# Patient Record
Sex: FEMALE | Race: OTHER | NOT HISPANIC OR LATINO
[De-identification: names, ages, dates, MRNs, and addresses within clinical notes are randomized per-mention and may not be internally consistent; named-entity substitution may affect disease eponyms.]

---

## 2020-05-08 PROBLEM — Z00.00 ENCOUNTER FOR PREVENTIVE HEALTH EXAMINATION: Status: ACTIVE | Noted: 2020-05-08

## 2020-05-12 ENCOUNTER — APPOINTMENT (OUTPATIENT)
Dept: OBGYN | Facility: CLINIC | Age: 50
End: 2020-05-12

## 2020-06-16 ENCOUNTER — APPOINTMENT (OUTPATIENT)
Dept: OBGYN | Facility: CLINIC | Age: 50
End: 2020-06-16
Payer: MEDICAID

## 2020-06-16 VITALS
TEMPERATURE: 97.5 F | WEIGHT: 138 LBS | HEART RATE: 102 BPM | SYSTOLIC BLOOD PRESSURE: 127 MMHG | BODY MASS INDEX: 26.06 KG/M2 | HEIGHT: 61 IN | OXYGEN SATURATION: 98 % | DIASTOLIC BLOOD PRESSURE: 83 MMHG

## 2020-06-16 DIAGNOSIS — D21.9 BENIGN NEOPLASM OF CONNECTIVE AND OTHER SOFT TISSUE, UNSPECIFIED: ICD-10-CM

## 2020-06-16 DIAGNOSIS — Z56.0 UNEMPLOYMENT, UNSPECIFIED: ICD-10-CM

## 2020-06-16 DIAGNOSIS — Z92.89 PERSONAL HISTORY OF OTHER MEDICAL TREATMENT: ICD-10-CM

## 2020-06-16 PROCEDURE — 99203 OFFICE O/P NEW LOW 30 MIN: CPT

## 2020-06-16 SDOH — ECONOMIC STABILITY - INCOME SECURITY: UNEMPLOYMENT, UNSPECIFIED: Z56.0

## 2020-06-16 NOTE — HISTORY OF PRESENT ILLNESS
[Normal Amount/Duration] : was of a normal amount and duration [Definite:  ___ (Date)] : the last menstrual period was [unfilled] [Sexually Active] : is sexually active [Menarche Age: ____] : age at menarche was [unfilled] [Male ___] : [unfilled] male [Regular Cycle Intervals] : periods have been irregular

## 2020-06-16 NOTE — PHYSICAL EXAM
[Awake] : awake [Alert] : alert [Soft] : soft [Oriented x3] : oriented to person, place, and time [Normal] : uterus [No Bleeding] : there was no active vaginal bleeding [Enlarged ___ wks] : enlarged [unfilled] ~Uweeks [Pap Obtained] : a Pap smear was performed [Normal Position] : in a normal position [Uterine Adnexae] : were not tender and not enlarged [Acute Distress] : no acute distress [Nipple Discharge] : no nipple discharge [Mass] : no breast mass [Axillary LAD] : no axillary lymphadenopathy [Tender] : non tender

## 2020-06-18 LAB
C TRACH RRNA SPEC QL NAA+PROBE: NOT DETECTED
HPV HIGH+LOW RISK DNA PNL CVX: NOT DETECTED
N GONORRHOEA RRNA SPEC QL NAA+PROBE: NOT DETECTED
SOURCE TP AMPLIFICATION: NORMAL

## 2020-06-19 LAB — CYTOLOGY CVX/VAG DOC THIN PREP: NORMAL

## 2020-06-22 ENCOUNTER — INPATIENT (INPATIENT)
Facility: HOSPITAL | Age: 50
LOS: 0 days | Discharge: HOME | DRG: 761 | End: 2020-06-23
Attending: HOSPITALIST | Admitting: HOSPITALIST
Payer: MEDICAID

## 2020-06-22 ENCOUNTER — TRANSCRIPTION ENCOUNTER (OUTPATIENT)
Age: 50
End: 2020-06-22

## 2020-06-22 VITALS
HEIGHT: 62 IN | RESPIRATION RATE: 16 BRPM | OXYGEN SATURATION: 100 % | TEMPERATURE: 98 F | DIASTOLIC BLOOD PRESSURE: 78 MMHG | HEART RATE: 91 BPM | WEIGHT: 138.01 LBS | SYSTOLIC BLOOD PRESSURE: 115 MMHG

## 2020-06-22 DIAGNOSIS — D64.9 ANEMIA, UNSPECIFIED: ICD-10-CM

## 2020-06-22 DIAGNOSIS — Z29.9 ENCOUNTER FOR PROPHYLACTIC MEASURES, UNSPECIFIED: ICD-10-CM

## 2020-06-22 DIAGNOSIS — D25.9 LEIOMYOMA OF UTERUS, UNSPECIFIED: ICD-10-CM

## 2020-06-22 LAB
ABO RH CONFIRMATION: SIGNIFICANT CHANGE UP
ALBUMIN SERPL ELPH-MCNC: 3.5 G/DL — SIGNIFICANT CHANGE UP (ref 3.5–5)
ALP SERPL-CCNC: 68 U/L — SIGNIFICANT CHANGE UP (ref 40–120)
ALT FLD-CCNC: 25 U/L DA — SIGNIFICANT CHANGE UP (ref 10–60)
ANION GAP SERPL CALC-SCNC: 8 MMOL/L — SIGNIFICANT CHANGE UP (ref 5–17)
APPEARANCE UR: CLEAR — SIGNIFICANT CHANGE UP
APTT BLD: 22.4 SEC — LOW (ref 27.5–36.3)
AST SERPL-CCNC: 15 U/L — SIGNIFICANT CHANGE UP (ref 10–40)
BASOPHILS # BLD AUTO: 0.07 K/UL — SIGNIFICANT CHANGE UP (ref 0–0.2)
BASOPHILS NFR BLD AUTO: 0.9 % — SIGNIFICANT CHANGE UP (ref 0–2)
BILIRUB SERPL-MCNC: 0.2 MG/DL — SIGNIFICANT CHANGE UP (ref 0.2–1.2)
BILIRUB UR-MCNC: NEGATIVE — SIGNIFICANT CHANGE UP
BUN SERPL-MCNC: 12 MG/DL — SIGNIFICANT CHANGE UP (ref 7–18)
CALCIUM SERPL-MCNC: 9 MG/DL — SIGNIFICANT CHANGE UP (ref 8.4–10.5)
CHLORIDE SERPL-SCNC: 107 MMOL/L — SIGNIFICANT CHANGE UP (ref 96–108)
CO2 SERPL-SCNC: 23 MMOL/L — SIGNIFICANT CHANGE UP (ref 22–31)
COLOR SPEC: YELLOW — SIGNIFICANT CHANGE UP
CREAT SERPL-MCNC: 0.7 MG/DL — SIGNIFICANT CHANGE UP (ref 0.5–1.3)
DIFF PNL FLD: NEGATIVE — SIGNIFICANT CHANGE UP
EOSINOPHIL # BLD AUTO: 0.18 K/UL — SIGNIFICANT CHANGE UP (ref 0–0.5)
EOSINOPHIL NFR BLD AUTO: 2.3 % — SIGNIFICANT CHANGE UP (ref 0–6)
GLUCOSE SERPL-MCNC: 86 MG/DL — SIGNIFICANT CHANGE UP (ref 70–99)
GLUCOSE UR QL: NEGATIVE — SIGNIFICANT CHANGE UP
HCG SERPL-ACNC: <1 MIU/ML — SIGNIFICANT CHANGE UP
HCT VFR BLD CALC: 24.4 % — LOW (ref 34.5–45)
HGB BLD-MCNC: 7.1 G/DL — LOW (ref 11.5–15.5)
IMM GRANULOCYTES NFR BLD AUTO: 0.5 % — SIGNIFICANT CHANGE UP (ref 0–1.5)
INR BLD: 1.03 RATIO — SIGNIFICANT CHANGE UP (ref 0.88–1.16)
IRON SATN MFR SERPL: 14 UG/DL — LOW (ref 40–150)
IRON SATN MFR SERPL: 3 % — LOW (ref 15–50)
KETONES UR-MCNC: NEGATIVE — SIGNIFICANT CHANGE UP
LEUKOCYTE ESTERASE UR-ACNC: NEGATIVE — SIGNIFICANT CHANGE UP
LYMPHOCYTES # BLD AUTO: 1.88 K/UL — SIGNIFICANT CHANGE UP (ref 1–3.3)
LYMPHOCYTES # BLD AUTO: 24.3 % — SIGNIFICANT CHANGE UP (ref 13–44)
MCHC RBC-ENTMCNC: 21.1 PG — LOW (ref 27–34)
MCHC RBC-ENTMCNC: 29.1 GM/DL — LOW (ref 32–36)
MCV RBC AUTO: 72.6 FL — LOW (ref 80–100)
MONOCYTES # BLD AUTO: 0.55 K/UL — SIGNIFICANT CHANGE UP (ref 0–0.9)
MONOCYTES NFR BLD AUTO: 7.1 % — SIGNIFICANT CHANGE UP (ref 2–14)
NEUTROPHILS # BLD AUTO: 5.03 K/UL — SIGNIFICANT CHANGE UP (ref 1.8–7.4)
NEUTROPHILS NFR BLD AUTO: 64.9 % — SIGNIFICANT CHANGE UP (ref 43–77)
NITRITE UR-MCNC: NEGATIVE — SIGNIFICANT CHANGE UP
NRBC # BLD: 0 /100 WBCS — SIGNIFICANT CHANGE UP (ref 0–0)
OB PNL STL: NEGATIVE — SIGNIFICANT CHANGE UP
PH UR: 5 — SIGNIFICANT CHANGE UP (ref 5–8)
PLATELET # BLD AUTO: 364 K/UL — SIGNIFICANT CHANGE UP (ref 150–400)
POTASSIUM SERPL-MCNC: 3.7 MMOL/L — SIGNIFICANT CHANGE UP (ref 3.5–5.3)
POTASSIUM SERPL-SCNC: 3.7 MMOL/L — SIGNIFICANT CHANGE UP (ref 3.5–5.3)
PROT SERPL-MCNC: 7.9 G/DL — SIGNIFICANT CHANGE UP (ref 6–8.3)
PROT UR-MCNC: NEGATIVE — SIGNIFICANT CHANGE UP
PROTHROM AB SERPL-ACNC: 11.7 SEC — SIGNIFICANT CHANGE UP (ref 10–12.9)
RBC # BLD: 3.36 M/UL — LOW (ref 3.8–5.2)
RBC # FLD: 17.3 % — HIGH (ref 10.3–14.5)
SODIUM SERPL-SCNC: 138 MMOL/L — SIGNIFICANT CHANGE UP (ref 135–145)
SP GR SPEC: 1.01 — SIGNIFICANT CHANGE UP (ref 1.01–1.02)
TIBC SERPL-MCNC: 485 UG/DL — HIGH (ref 250–450)
UIBC SERPL-MCNC: 471 UG/DL — HIGH (ref 110–370)
UROBILINOGEN FLD QL: NEGATIVE — SIGNIFICANT CHANGE UP
WBC # BLD: 7.75 K/UL — SIGNIFICANT CHANGE UP (ref 3.8–10.5)
WBC # FLD AUTO: 7.75 K/UL — SIGNIFICANT CHANGE UP (ref 3.8–10.5)

## 2020-06-22 PROCEDURE — 99223 1ST HOSP IP/OBS HIGH 75: CPT | Mod: GC

## 2020-06-22 PROCEDURE — 71046 X-RAY EXAM CHEST 2 VIEWS: CPT | Mod: 26

## 2020-06-22 PROCEDURE — 76857 US EXAM PELVIC LIMITED: CPT | Mod: 26

## 2020-06-22 PROCEDURE — 99283 EMERGENCY DEPT VISIT LOW MDM: CPT

## 2020-06-22 PROCEDURE — 76830 TRANSVAGINAL US NON-OB: CPT | Mod: 26

## 2020-06-22 RX ORDER — FERROUS SULFATE 325(65) MG
325 TABLET ORAL DAILY
Refills: 0 | Status: DISCONTINUED | OUTPATIENT
Start: 2020-06-22 | End: 2020-06-22

## 2020-06-22 RX ORDER — IRON SUCROSE 20 MG/ML
200 INJECTION, SOLUTION INTRAVENOUS ONCE
Refills: 0 | Status: COMPLETED | OUTPATIENT
Start: 2020-06-23 | End: 2020-06-23

## 2020-06-22 RX ORDER — IRON SUCROSE 20 MG/ML
200 INJECTION, SOLUTION INTRAVENOUS ONCE
Refills: 0 | Status: COMPLETED | OUTPATIENT
Start: 2020-06-22 | End: 2020-06-22

## 2020-06-22 RX ADMIN — IRON SUCROSE 110 MILLIGRAM(S): 20 INJECTION, SOLUTION INTRAVENOUS at 20:54

## 2020-06-22 NOTE — DISCHARGE NOTE PROVIDER - NSDCMRMEDTOKEN_GEN_ALL_CORE_FT
ascorbic acid 500 mg oral tablet, chewable: 1 tab(s) orally once a day  ferrous sulfate 325 mg (65 mg elemental iron) oral delayed release tablet: 1 tab(s) orally once a day  senna oral tablet: 2 tab(s) orally once a day (at bedtime)

## 2020-06-22 NOTE — H&P ADULT - PROBLEM SELECTOR PLAN 2
-will hold AC in the setting of anemia -H/o fibroid uterus 2 years diagnosed 2 years ago  -Pelvic/Transvaginal US: Enlarged fibroid uterus. Mildly prominent endometrium of the 17 mm  -Pt f/u outpatient gynecologist Dr. Upton in [facility]

## 2020-06-22 NOTE — H&P ADULT - NSHPPHYSICALEXAM_GEN_ALL_CORE
Vital Signs Last 24 Hrs  T(C): 36.7 (22 Jun 2020 15:20), Max: 36.9 (22 Jun 2020 13:11)  T(F): 98 (22 Jun 2020 15:20), Max: 98.4 (22 Jun 2020 13:11)  HR: 94 (22 Jun 2020 15:20) (91 - 94)  BP: 122/74 (22 Jun 2020 15:20) (115/78 - 122/74)  BP(mean): --  RR: 16 (22 Jun 2020 15:20) (16 - 16)  SpO2: 99% (22 Jun 2020 15:20) (99% - 100%) Vital Signs Last 24 Hrs  T(C): 36.7 (22 Jun 2020 15:20), Max: 36.9 (22 Jun 2020 13:11)  T(F): 98 (22 Jun 2020 15:20), Max: 98.4 (22 Jun 2020 13:11)  HR: 94 (22 Jun 2020 15:20) (91 - 94)  BP: 122/74 (22 Jun 2020 15:20) (115/78 - 122/74)  RR: 16 (22 Jun 2020 15:20) (16 - 16)  SpO2: 99% (22 Jun 2020 15:20) (99% - 100%)

## 2020-06-22 NOTE — H&P ADULT - NSHPLABSRESULTS_GEN_ALL_CORE
7.1    7.75  )-----------( 364      ( 22 Jun 2020 14:43 )             24.4       06-22    138  |  107  |  12  ----------------------------<  86  3.7   |  23  |  0.70    Ca    9.0      22 Jun 2020 14:43    TPro  7.9  /  Alb  3.5  /  TBili  0.2  /  DBili  x   /  AST  15  /  ALT  25  /  AlkPhos  68  06-22      < from: Xray Chest 2 Views PA/Lat (06.22.20 @ 15:25) >    Heart size is within normal limits. The lung fields and pleural surfaces are unremarkable.    IMPRESSION: Negative chest.    < end of copied text >

## 2020-06-22 NOTE — ED PROVIDER NOTE - OBJECTIVE STATEMENT
51 y/o female who denies PMHx presents with abnormal lab work.  Pt says this past April she had a miscarriage and had heavy vaginal bleeding for over 1 week.  This was during the COVID pandemic and patient did not want to come to the hospital.  The bleeding resolved in 1 week and the patient never followed up.  The following month (May) patient again said she had a heavy menstrual cycle which lasted 1 week.  pt is not currently vaginal bleeding.   pt had routine follow up with her PMD over the weekend and was called today and told to go the emergency department because of anemia.  Pt denies chest pain, but does endorse worsening dyspnea on exertion and feeling fatigued.

## 2020-06-22 NOTE — DISCHARGE NOTE PROVIDER - HOSPITAL COURSE
51 y/o female with PMHx of uterine fibroid was sent by his PCP due to low Hb of 6.7. Pt says this past April she had a miscarriage and had heavy vaginal bleeding for over 1 week. This was during the COVID pandemic and patient did not want to come to the hospital. The bleeding resolved in 1 week and the patient never followed up.  Last month again patient had a heavy menstrual cycle which lasted 1 week. Pt had routine follow up with her PMD over the weekend and was called today and was told to go the emergency department because of anemia.Pt  endorses  dyspnea on exertion and fatigue. Pt denies any chest pain, palpitations, sweating, recent trauma or surgery, abdominal pain. N/V/D,  blurry vision, black stools, blood in urine or any other acute complaints.     Pt was admitted for Symptomatic anemia due to menorrhagia    Pelvic/Transvaginal US: Enlarged fibroid uterus. Mildly prominent endometrium of the 17 mm    Transfused 1 PRBC and gave venofer. 51 y/o female with PMHx of uterine fibroid was sent by his PCP due to low Hb of 6.7. Pt says this past April she had a miscarriage and had heavy vaginal bleeding for over 1 week. This was during the COVID pandemic and patient did not want to come to the hospital. The bleeding resolved in 1 week and the patient never followed up.  Last month again patient had a heavy menstrual cycle which lasted 1 week. Pt had routine follow up with her PMD over the weekend and was called today and was told to go the emergency department because of anemia.Pt  endorses  dyspnea on exertion and fatigue. Pt denies any chest pain, palpitations, sweating, recent trauma or surgery, abdominal pain. N/V/D,  blurry vision, black stools, blood in urine or any other acute complaints.     Pt was admitted for Symptomatic anemia due to menorrhagia    Pelvic/Transvaginal US: Enlarged fibroid uterus. Mildly prominent endometrium of the 17 mm    Transfused 1 PRBC and gave venofer.                 The hospital course of this patient was uncomplicated and the patient was discharged in stable medical condition. 51 y/o female with PMHx of uterine fibroid was sent by her PCP due to low Hb of 6.7. Pt with recent history of miscarriage and heavy vaginal bleeding. Pt admitted for symptomatic anemia due to menorrhagia. Hemoglobin on admission was 7.1/21.1. Transfused one unit PRBC on 6/23 with improvement to 9.1/22.9. Given two doses of IV venofer. Pelvic/TV ultrasound showed enlarged fibroid uterus, mildly prominent endometrium of the 17mm. Pt to follow up with GYN as an outpatient.         Pt is clinically improved and medically stable for discharge home.

## 2020-06-22 NOTE — H&P ADULT - PROBLEM SELECTOR PLAN 1
-p/w Hb 7.1, ZARATE and fatigue  -Microcytic, hypochromic anemia due to Iron deficiency due to heavy menstrual periods  -Hemodynamically stable  -Serum iron levels, % saturation low, TIBC high  -FOBT negative  -will transfuse 2 PRBC  -will give iron supplementation   -f/u Post transfusion CBC  -Monitor CBC -p/w Hb 7.1, ZARATE and fatigue  -Microcytic, hypochromic anemia due to Iron deficiency due to heavy menstrual periods  -Hemodynamically stable  -Serum iron levels, % saturation low, TIBC high  -FOBT negative  -will transfuse 2 PRBC  -will give iron supplementation   -f/u Post transfusion CBC  -f/u US Pelvis/Transvaginal  -Monitor CBC  -GYN consult -p/w Hb 7.1, ZARATE and fatigue  -Iron deficiency anemia due to menorrhagia  -Hemodynamically stable  -Serum iron levels, % saturation low, TIBC high  -FOBT negative  -Pelvic/Transvaginal US: Enlarged fibroid uterus. Mildly prominent endometrium of the 17 mm  -will transfuse 2 PRBC  -will give iron supplementation   -f/u Post transfusion CBC  -Monitor CBC -p/w Hb 7.1, ZARATE and fatigue  -Iron deficiency anemia due to menorrhagia  -Hemodynamically stable  -Serum iron levels, % saturation low, TIBC high  -FOBT negative  -Pelvic/Transvaginal US: Enlarged fibroid uterus. Mildly prominent endometrium of the 17 mm  -will transfuse 1 PRBC  -will give iron supplementation   -f/u Post transfusion CBC  -Monitor CBC -p/w Hb 7.1, ZARATE and fatigue  -Iron deficiency anemia due to menorrhagia  -Hemodynamically stable  -Serum iron levels, % saturation low, TIBC high  -FOBT negative  -Pelvic/Transvaginal US: Enlarged fibroid uterus. Mildly prominent endometrium of the 17 mm  -will transfuse 1 PRBC  -will give venofer 200 mg once today and tomorrow  -f/u Post transfusion CBC  -Monitor CBC

## 2020-06-22 NOTE — H&P ADULT - HISTORY OF PRESENT ILLNESS
49 y/o female with no significant PMHx was sent by his PCP due to abnormal lab result of low Hb. Pt says this past April she had a miscarriage and had heavy vaginal bleeding for over 1 week.This was during the COVID pandemic and patient did not want to come to the hospital.The bleeding resolved in 1 week and the patient never followed up.  Last month again patient had a heavy menstrual cycle which lasted 1 week. Pt had routine follow up with her PMD over the weekend and was called today and was told to go the emergency department because of anemia.Pt  endorses  dyspnea on exertion and fatigue. Pt denies any chest pain, palpitations, sweating, recent trauma or surgery, abdominal pain. N/V/D,  blurry vision, black stools, blood in urine or any other acute complaints. 49 y/o female with no significant PMHx was sent by his PCP due to abnormal lab result of low Hb. Pt says this past April she had a miscarriage and had heavy vaginal bleeding for over 1 week.This was during the COVID pandemic and patient did not want to come to the hospital.The bleeding resolved in 1 week and the patient never followed up.  Last month again patient had a heavy menstrual cycle which lasted 1 week. Pt had routine follow up with her PMD over the weekend and was called today and was told to go the emergency department because of anemia.Pt  endorses  dyspnea on exertion and fatigue. Pt denies any chest pain, palpitations, sweating, recent trauma or surgery, abdominal pain. N/V/D,  blurry vision, black stools, blood in urine or any other acute complaints.     In ED, /74, HR 94 51 y/o female with PMHx of uterine fibroid was sent by his PCP due to low Hb of 6.7. Pt says this past April she had a miscarriage and had heavy vaginal bleeding for over 1 week. This was during the COVID pandemic and patient did not want to come to the hospital. The bleeding resolved in 1 week and the patient never followed up.  Last month again patient had a heavy menstrual cycle which lasted 1 week. Pt had routine follow up with her PMD over the weekend and was called today and was told to go the emergency department because of anemia.Pt  endorses  dyspnea on exertion and fatigue. Pt denies any chest pain, palpitations, sweating, recent trauma or surgery, abdominal pain. N/V/D,  blurry vision, black stools, blood in urine or any other acute complaints.     In ED, /74, HR 94

## 2020-06-22 NOTE — H&P ADULT - GASTROINTESTINAL DETAILS
nontender/soft/bowel sounds normal no guarding/nontender/soft/no rebound tenderness/bowel sounds normal/no distention

## 2020-06-22 NOTE — H&P ADULT - ATTENDING COMMENTS
Patient was seen and examined by myself. Case was discussed with house staff in details. I have reviewed and agree with the plan as outlined above with edits where appropriate.      MEDICAL ATTENDING ADDENDUM to H&P      Vital Signs Last 24 Hrs  T(C): 37.1 (22 Jun 2020 20:45), Max: 37.1 (22 Jun 2020 20:45)  T(F): 98.8 (22 Jun 2020 20:45), Max: 98.8 (22 Jun 2020 20:45)  HR: 71 (22 Jun 2020 20:45) (71 - 96)  BP: 111/71 (22 Jun 2020 20:45) (111/71 - 125/71)  RR: 18 (22 Jun 2020 20:45) (16 - 18)  SpO2: 100% (22 Jun 2020 20:45) (98% - 100%)    _________________  PHYSICAL EXAM:  ---------------------------   NAD; Normocephalic;   LUNGS - no wheezing;  clear to auscultation  HEART: S1 S2+   ABDOMEN: Soft, Nontender, non distended, BS+  EXTREMITIES: no cyanosis; no edema  NERVOUS SYSTEM:  Awake and alert; oriented  PSYCH: Normal affect and behavior  VASC: + distal peripheral pulses  MSK/ BACK: normal shape and intact ROM  SKIN: warm and dry    _________________________________________________  LABS:                        7.1    7.75  )-----------( 364      ( 22 Jun 2020 14:43 )             24.4     06-22    138  |  107  |  12  ----------------------------<  86  3.7   |  23  |  0.70    Ca    9.0      22 Jun 2020 14:43    TPro  7.9  /  Alb  3.5  /  TBili  0.2  /  DBili  x   /  AST  15  /  ALT  25  /  AlkPhos  68  06-22    PT/INR - ( 22 Jun 2020 14:43 )   PT: 11.7 sec;   INR: 1.03 ratio         PTT - ( 22 Jun 2020 14:43 )  PTT:22.4 sec    CAPILLARY BLOOD GLUCOSE      A/P: 49 y/o F sent to ED by outpatient physician due to low hb admitted for   -  Acute on chronic  blood loss anemia (with generalized weakness) as a result of Menorrhagia due to Uterine fibroids    * Transfuse PRBC  * follow up iron studies- sent prior to transfusion  * Venofer infusion   * Outpatient GYN follow up  Anticipate discharge within 24 hrs        Plan of care was discussed and all questions / concerns were answered & addressed;  care was aligned with patient's wishes.  Patient 's test results, diagnostic and therapeutic options and overall plan of care were discussed.

## 2020-06-22 NOTE — DISCHARGE NOTE PROVIDER - NSDCCPCAREPLAN_GEN_ALL_CORE_FT
PRINCIPAL DISCHARGE DIAGNOSIS  Diagnosis: Symptomatic anemia  Assessment and Plan of Treatment: You were sent by your PCP due to low Hb 6.7 due to iron defeciency due to menorrhagia. You were transfused 1 unit PRBC and venofer. Take iron supplementation. Follow with your gynecologist.      SECONDARY DISCHARGE DIAGNOSES  Diagnosis: Fibroid uterus  Assessment and Plan of Treatment: You were admitted symptomatic anemia due to menorrhagia.  Pelvic/Transvaginal Ultrasound showed enlarged fibroid uterus. Mildly prominent endometrium of the 17 mm. Follow with your gynecologist. PRINCIPAL DISCHARGE DIAGNOSIS  Diagnosis: Symptomatic anemia  Assessment and Plan of Treatment: You were sent by your PCP because of severe iron deficiency anemia due to menorrhagia. You were transfused 1 unit PRBC and given IV iron. Continue to take oral iron supplements and Vitamin C. Follow up with  your primary care doctor within one week for routine monitoring of your labwork. Follow up with your GYN doctor for continued monitoring of your fibroids.      SECONDARY DISCHARGE DIAGNOSES  Diagnosis: Fibroid uterus  Assessment and Plan of Treatment: You were admitted symptomatic anemia due to menorrhagia.  Pelvic/Transvaginal Ultrasound showed enlarged fibroid uterus. Mildly prominent endometrium of the 17 mm. Follow with your gynecologist. PRINCIPAL DISCHARGE DIAGNOSIS  Diagnosis: Symptomatic anemia  Assessment and Plan of Treatment: You were sent by your PCP because of severe iron deficiency anemia due to menorrhagia. You were transfused 1 unit PRBC and given IV iron. Continue to take oral iron supplements and Vitamin C which are over the counter. Iron can be constipating - please use stool softeners over the counter as needed as well. Iron can turn your stool dark which is a normal side effect, however, if you notice bloody or tarry stools contact your health care provider. Follow up with  your primary care doctor within one week for routine monitoring of your labwork. Follow up with your GYN doctor for continued monitoring of your fibroids.      SECONDARY DISCHARGE DIAGNOSES  Diagnosis: Fibroid uterus  Assessment and Plan of Treatment: You were admitted symptomatic anemia due to menorrhagia.  Pelvic/Transvaginal Ultrasound showed enlarged fibroid uterus. Mildly prominent endometrium of the 17 mm. Follow with your gynecologist.

## 2020-06-22 NOTE — DISCHARGE NOTE PROVIDER - ATTENDING COMMENTS
MEDICAL ATTENDING DISCHARGE NOTE :        Patient is a 50y old  Female who presents with a chief complaint of Symptomatic anemia (22 Jun 2020 18:36)            INTERVAL HPI / OVERNIGHT EVENTS: patient with uneventful night and offers no new complaints        ----------------------------------------------------------------------------------    REVIEW OF SYSTEMS: no fever; no SOB            Vital Signs Last 24 Hrs    T(C): 37 (23 Jun 2020 10:53), Max: 37.1 (22 Jun 2020 20:45)    T(F): 98.6 (23 Jun 2020 10:53), Max: 98.8 (22 Jun 2020 20:45)    HR: 78 (23 Jun 2020 10:53) (70 - 96)    BP: 96/43 (23 Jun 2020 10:53) (96/43 - 126/62)    BP(mean): --    RR: 16 (23 Jun 2020 10:53) (16 - 18)    SpO2: 99% (23 Jun 2020 10:53) (98% - 100%)        _________________    PHYSICAL EXAM:    ---------------------------     NAD; Normocephalic    LUNGS - no wheezing    HEART: S1 S2+     ABDOMEN: Soft, Nontender, non distended    EXTREMITIES: no cyanosis; no edema            _________________________________________________    LABS:                            9.1      7.60  )-----------( 332      ( 23 Jun 2020 06:55 )               29.0         06-23        139  |  107  |  11    ----------------------------<  89    3.6   |  22  |  0.70        Ca    8.5      23 Jun 2020 06:55    Phos  3.3     06-23    Mg     2.3     06-23        TPro  7.2  /  Alb  3.2<L>  /  TBili  0.5  /  DBili  x   /  AST  17  /  ALT  24  /  AlkPhos  62  06-23        PT/INR - ( 22 Jun 2020 14:43 )   PT: 11.7 sec;   INR: 1.03 ratio               PTT - ( 22 Jun 2020 14:43 )  PTT:22.4 sec        A/P: 51 y/o F with anemia likely due to uterine fibroid associated menorrhagia    - clinically stable post PRBC transfusion and Venofer     - outpatient GYN follow up - Dr Garcia    - Oral iron supplements recommended.                Plan of care, test results and findings were  discussed with patient; all questions and concerns were addressed and care was aligned with patient's wishes.    PMD follow up after discharge from the hospital for continued care and outpatient monitoring was advised.    Discharge plans was discussed during rounds with care team including the NP staff and primary nursing staff.

## 2020-06-22 NOTE — ED ADULT NURSE REASSESSMENT NOTE - NS ED NURSE REASSESS COMMENT FT1
Assumed care  Pt is A&Ox3  Denies CP/dizziness  confirmed pt is still waiting for blood, This RN spoke with Dr Canales confirmed pt is to get 1Unit PRBC, call placed to blood bank to confirm blood is ready for  at this time  care to be provided as per MD order

## 2020-06-22 NOTE — ED PROVIDER NOTE - CLINICAL SUMMARY MEDICAL DECISION MAKING FREE TEXT BOX
Pt sent for low H/H.  Pt reporting heavy vaginal bleeding 1 month ago which has resolved.  Will check labs, pelvic sono, and will transfuse as needed.

## 2020-06-22 NOTE — H&P ADULT - ASSESSMENT
1 y/o female with no significant PMHx was sent by his PCP due to abnormal lab result of low Hb. Pt says this past April she had a miscarriage and had heavy vaginal bleeding for over 1 week.This was during the COVID pandemic and patient did not want to come to the hospital.The bleeding resolved in 1 week and the patient never followed up.  Last month again patient had a heavy menstrual cycle which lasted 1 week. Pt had routine follow up with her PMD over the weekend and was called today and was told to go the emergency department because of anemia.Pt  endorses  dyspnea on exertion and fatigue. Pt denies any chest pain, palpitations, sweating, recent trauma or surgery, abdominal pain. N/V/D,  blurry vision, black stools, blood in urine or any other acute complaints. 1 y/o female with no significant PMHx was sent by his PCP due to abnormal lab result of low Hb. Pt says this past April she had a miscarriage and had heavy vaginal bleeding for over 1 week.This was during the COVID pandemic and patient did not want to come to the hospital.The bleeding resolved in 1 week and the patient never followed up.  Last month again patient had a heavy menstrual cycle which lasted 1 week. Pt had routine follow up with her PMD over the weekend and was called today and was told to go the emergency department because of anemia.Pt  endorses  dyspnea on exertion and fatigue. Pt denies any chest pain, palpitations, sweating, recent trauma or surgery, abdominal pain. N/V/D,  blurry vision, black stools, blood in urine or any other acute complaints.     In ED, /74, HR 94  Labs significant for Hb 7.1 1 y/o female with no significant PMHx was sent by his PCP due to abnormal lab result of low Hb. Pt says this past April she had a miscarriage and had heavy vaginal bleeding for over 1 week.This was during the COVID pandemic and patient did not want to come to the hospital.The bleeding resolved in 1 week and the patient never followed up.  Last month again patient had a heavy menstrual cycle which lasted 1 week. Pt had routine follow up with her PMD over the weekend and was called today and was told to go the emergency department because of anemia.Pt  endorses  dyspnea on exertion and fatigue. Pt denies any chest pain, palpitations, sweating, recent trauma or surgery, abdominal pain. N/V/D,  blurry vision, black stools, blood in urine or any other acute complaints.     In ED, /74, HR 94  Labs significant for Hb 7.1  EKG: NSR  Pelvic/Transvaginal US: Enlarged fibroid uterus. Mildly prominent endometrium of the 17 mm. 49 y/o female with no significant PMHx was sent by his PCP due to abnormal lab result of low Hb. Pt says this past April she had a miscarriage and had heavy vaginal bleeding for over 1 week.This was during the COVID pandemic and patient did not want to come to the hospital.The bleeding resolved in 1 week and the patient never followed up.  Last month again patient had a heavy menstrual cycle which lasted 1 week. Pt had routine follow up with her PMD over the weekend and was called today and was told to go the emergency department because of anemia.Pt  endorses  dyspnea on exertion and fatigue. Pt denies any chest pain, palpitations, sweating, recent trauma or surgery, abdominal pain. N/V/D,  blurry vision, black stools, blood in urine or any other acute complaints.     In ED, /74, HR 94  Labs significant for Hb 7.1  EKG: NSR  Pelvic/Transvaginal US: Enlarged fibroid uterus. Mildly prominent endometrium of the 17 mm.

## 2020-06-22 NOTE — DISCHARGE NOTE PROVIDER - CARE PROVIDER_API CALL
CHRISTEL MULTANI  [id number]  [phone] [place] [address]  Phone: [phone]  Fax: [phone]  Follow Up Time:

## 2020-06-23 ENCOUNTER — TRANSCRIPTION ENCOUNTER (OUTPATIENT)
Age: 50
End: 2020-06-23

## 2020-06-23 VITALS
OXYGEN SATURATION: 99 % | TEMPERATURE: 99 F | SYSTOLIC BLOOD PRESSURE: 96 MMHG | HEART RATE: 78 BPM | RESPIRATION RATE: 16 BRPM | DIASTOLIC BLOOD PRESSURE: 43 MMHG

## 2020-06-23 LAB
A1C WITH ESTIMATED AVERAGE GLUCOSE RESULT: 5.6 % — SIGNIFICANT CHANGE UP (ref 4–5.6)
ALBUMIN SERPL ELPH-MCNC: 3.2 G/DL — LOW (ref 3.5–5)
ALP SERPL-CCNC: 62 U/L — SIGNIFICANT CHANGE UP (ref 40–120)
ALT FLD-CCNC: 24 U/L DA — SIGNIFICANT CHANGE UP (ref 10–60)
ANION GAP SERPL CALC-SCNC: 10 MMOL/L — SIGNIFICANT CHANGE UP (ref 5–17)
AST SERPL-CCNC: 17 U/L — SIGNIFICANT CHANGE UP (ref 10–40)
BASOPHILS # BLD AUTO: 0.05 K/UL — SIGNIFICANT CHANGE UP (ref 0–0.2)
BASOPHILS NFR BLD AUTO: 0.7 % — SIGNIFICANT CHANGE UP (ref 0–2)
BILIRUB SERPL-MCNC: 0.5 MG/DL — SIGNIFICANT CHANGE UP (ref 0.2–1.2)
BUN SERPL-MCNC: 11 MG/DL — SIGNIFICANT CHANGE UP (ref 7–18)
CALCIUM SERPL-MCNC: 8.5 MG/DL — SIGNIFICANT CHANGE UP (ref 8.4–10.5)
CHLORIDE SERPL-SCNC: 107 MMOL/L — SIGNIFICANT CHANGE UP (ref 96–108)
CHOLEST SERPL-MCNC: 205 MG/DL — HIGH (ref 10–199)
CO2 SERPL-SCNC: 22 MMOL/L — SIGNIFICANT CHANGE UP (ref 22–31)
CREAT SERPL-MCNC: 0.7 MG/DL — SIGNIFICANT CHANGE UP (ref 0.5–1.3)
EOSINOPHIL # BLD AUTO: 0.23 K/UL — SIGNIFICANT CHANGE UP (ref 0–0.5)
EOSINOPHIL NFR BLD AUTO: 3 % — SIGNIFICANT CHANGE UP (ref 0–6)
ESTIMATED AVERAGE GLUCOSE: 114 MG/DL — SIGNIFICANT CHANGE UP (ref 68–114)
FERRITIN SERPL-MCNC: 4 NG/ML — LOW (ref 15–150)
FOLATE SERPL-MCNC: >20 NG/ML — SIGNIFICANT CHANGE UP
GLUCOSE SERPL-MCNC: 89 MG/DL — SIGNIFICANT CHANGE UP (ref 70–99)
HCT VFR BLD CALC: 29 % — LOW (ref 34.5–45)
HCT VFR BLD CALC: 33.1 % — LOW (ref 34.5–45)
HDLC SERPL-MCNC: 67 MG/DL — SIGNIFICANT CHANGE UP
HGB BLD-MCNC: 10.2 G/DL — LOW (ref 11.5–15.5)
HGB BLD-MCNC: 9.1 G/DL — LOW (ref 11.5–15.5)
IMM GRANULOCYTES NFR BLD AUTO: 0.4 % — SIGNIFICANT CHANGE UP (ref 0–1.5)
LDH SERPL L TO P-CCNC: 176 U/L — SIGNIFICANT CHANGE UP (ref 120–225)
LIPID PNL WITH DIRECT LDL SERPL: 118 MG/DL — SIGNIFICANT CHANGE UP
LYMPHOCYTES # BLD AUTO: 1.71 K/UL — SIGNIFICANT CHANGE UP (ref 1–3.3)
LYMPHOCYTES # BLD AUTO: 22.5 % — SIGNIFICANT CHANGE UP (ref 13–44)
MAGNESIUM SERPL-MCNC: 2.3 MG/DL — SIGNIFICANT CHANGE UP (ref 1.6–2.6)
MCHC RBC-ENTMCNC: 22.9 PG — LOW (ref 27–34)
MCHC RBC-ENTMCNC: 22.9 PG — LOW (ref 27–34)
MCHC RBC-ENTMCNC: 30.8 GM/DL — LOW (ref 32–36)
MCHC RBC-ENTMCNC: 31.4 GM/DL — LOW (ref 32–36)
MCV RBC AUTO: 73 FL — LOW (ref 80–100)
MCV RBC AUTO: 74.4 FL — LOW (ref 80–100)
MONOCYTES # BLD AUTO: 0.54 K/UL — SIGNIFICANT CHANGE UP (ref 0–0.9)
MONOCYTES NFR BLD AUTO: 7.1 % — SIGNIFICANT CHANGE UP (ref 2–14)
NEUTROPHILS # BLD AUTO: 5.04 K/UL — SIGNIFICANT CHANGE UP (ref 1.8–7.4)
NEUTROPHILS NFR BLD AUTO: 66.3 % — SIGNIFICANT CHANGE UP (ref 43–77)
NRBC # BLD: 0 /100 WBCS — SIGNIFICANT CHANGE UP (ref 0–0)
NRBC # BLD: 0 /100 WBCS — SIGNIFICANT CHANGE UP (ref 0–0)
PHOSPHATE SERPL-MCNC: 3.3 MG/DL — SIGNIFICANT CHANGE UP (ref 2.5–4.5)
PLATELET # BLD AUTO: 316 K/UL — SIGNIFICANT CHANGE UP (ref 150–400)
PLATELET # BLD AUTO: 332 K/UL — SIGNIFICANT CHANGE UP (ref 150–400)
POTASSIUM SERPL-MCNC: 3.6 MMOL/L — SIGNIFICANT CHANGE UP (ref 3.5–5.3)
POTASSIUM SERPL-SCNC: 3.6 MMOL/L — SIGNIFICANT CHANGE UP (ref 3.5–5.3)
PROT SERPL-MCNC: 7.2 G/DL — SIGNIFICANT CHANGE UP (ref 6–8.3)
RBC # BLD: 3.97 M/UL — SIGNIFICANT CHANGE UP (ref 3.8–5.2)
RBC # BLD: 3.97 M/UL — SIGNIFICANT CHANGE UP (ref 3.8–5.2)
RBC # BLD: 4.45 M/UL — SIGNIFICANT CHANGE UP (ref 3.8–5.2)
RBC # FLD: 16.8 % — HIGH (ref 10.3–14.5)
RBC # FLD: 17.5 % — HIGH (ref 10.3–14.5)
RETICS #: 54.4 K/UL — SIGNIFICANT CHANGE UP (ref 25–125)
RETICS/RBC NFR: 1.4 % — SIGNIFICANT CHANGE UP (ref 0.5–2.5)
SARS-COV-2 RNA SPEC QL NAA+PROBE: SIGNIFICANT CHANGE UP
SODIUM SERPL-SCNC: 139 MMOL/L — SIGNIFICANT CHANGE UP (ref 135–145)
TOTAL CHOLESTEROL/HDL RATIO MEASUREMENT: 3.1 RATIO — LOW (ref 3.3–7.1)
TRIGL SERPL-MCNC: 99 MG/DL — SIGNIFICANT CHANGE UP (ref 10–149)
TSH SERPL-MCNC: 3.02 UU/ML — SIGNIFICANT CHANGE UP (ref 0.34–4.82)
VIT B12 SERPL-MCNC: 456 PG/ML — SIGNIFICANT CHANGE UP (ref 232–1245)
WBC # BLD: 7.6 K/UL — SIGNIFICANT CHANGE UP (ref 3.8–10.5)
WBC # BLD: 7.92 K/UL — SIGNIFICANT CHANGE UP (ref 3.8–10.5)
WBC # FLD AUTO: 7.6 K/UL — SIGNIFICANT CHANGE UP (ref 3.8–10.5)
WBC # FLD AUTO: 7.92 K/UL — SIGNIFICANT CHANGE UP (ref 3.8–10.5)

## 2020-06-23 PROCEDURE — 85027 COMPLETE CBC AUTOMATED: CPT

## 2020-06-23 PROCEDURE — U0003: CPT

## 2020-06-23 PROCEDURE — 80061 LIPID PANEL: CPT

## 2020-06-23 PROCEDURE — 71046 X-RAY EXAM CHEST 2 VIEWS: CPT

## 2020-06-23 PROCEDURE — 83735 ASSAY OF MAGNESIUM: CPT

## 2020-06-23 PROCEDURE — 99285 EMERGENCY DEPT VISIT HI MDM: CPT | Mod: 25

## 2020-06-23 PROCEDURE — 83550 IRON BINDING TEST: CPT

## 2020-06-23 PROCEDURE — 76857 US EXAM PELVIC LIMITED: CPT

## 2020-06-23 PROCEDURE — 83540 ASSAY OF IRON: CPT

## 2020-06-23 PROCEDURE — 36430 TRANSFUSION BLD/BLD COMPNT: CPT

## 2020-06-23 PROCEDURE — 99239 HOSP IP/OBS DSCHRG MGMT >30: CPT | Mod: GC

## 2020-06-23 PROCEDURE — 83615 LACTATE (LD) (LDH) ENZYME: CPT

## 2020-06-23 PROCEDURE — 84100 ASSAY OF PHOSPHORUS: CPT

## 2020-06-23 PROCEDURE — 85730 THROMBOPLASTIN TIME PARTIAL: CPT

## 2020-06-23 PROCEDURE — 82746 ASSAY OF FOLIC ACID SERUM: CPT

## 2020-06-23 PROCEDURE — 82607 VITAMIN B-12: CPT

## 2020-06-23 PROCEDURE — 86850 RBC ANTIBODY SCREEN: CPT

## 2020-06-23 PROCEDURE — 83036 HEMOGLOBIN GLYCOSYLATED A1C: CPT

## 2020-06-23 PROCEDURE — 84443 ASSAY THYROID STIM HORMONE: CPT

## 2020-06-23 PROCEDURE — 76830 TRANSVAGINAL US NON-OB: CPT

## 2020-06-23 PROCEDURE — 82272 OCCULT BLD FECES 1-3 TESTS: CPT

## 2020-06-23 PROCEDURE — 85610 PROTHROMBIN TIME: CPT

## 2020-06-23 PROCEDURE — 85045 AUTOMATED RETICULOCYTE COUNT: CPT

## 2020-06-23 PROCEDURE — P9040: CPT

## 2020-06-23 PROCEDURE — 93005 ELECTROCARDIOGRAM TRACING: CPT

## 2020-06-23 PROCEDURE — 82728 ASSAY OF FERRITIN: CPT

## 2020-06-23 PROCEDURE — 86923 COMPATIBILITY TEST ELECTRIC: CPT

## 2020-06-23 PROCEDURE — 81003 URINALYSIS AUTO W/O SCOPE: CPT

## 2020-06-23 PROCEDURE — 86900 BLOOD TYPING SEROLOGIC ABO: CPT

## 2020-06-23 PROCEDURE — 80053 COMPREHEN METABOLIC PANEL: CPT

## 2020-06-23 PROCEDURE — 86901 BLOOD TYPING SEROLOGIC RH(D): CPT

## 2020-06-23 PROCEDURE — 84702 CHORIONIC GONADOTROPIN TEST: CPT

## 2020-06-23 PROCEDURE — 36415 COLL VENOUS BLD VENIPUNCTURE: CPT

## 2020-06-23 RX ORDER — SENNA PLUS 8.6 MG/1
2 TABLET ORAL
Qty: 0 | Refills: 0 | DISCHARGE

## 2020-06-23 RX ORDER — FERROUS SULFATE 325(65) MG
1 TABLET ORAL
Qty: 0 | Refills: 0 | DISCHARGE

## 2020-06-23 RX ORDER — ASCORBIC ACID 60 MG
1 TABLET,CHEWABLE ORAL
Qty: 0 | Refills: 0 | DISCHARGE

## 2020-06-23 RX ADMIN — IRON SUCROSE 110 MILLIGRAM(S): 20 INJECTION, SOLUTION INTRAVENOUS at 11:19

## 2020-06-23 NOTE — DISCHARGE NOTE NURSING/CASE MANAGEMENT/SOCIAL WORK - PATIENT PORTAL LINK FT
You can access the FollowMyHealth Patient Portal offered by [facility] by registering at the following website: [email or web address]. By joining [facility] FollowMyHealth portal, you will also be able to view your health information using other applications (apps) compatible with our system.

## 2020-06-25 PROBLEM — Z78.9 OTHER SPECIFIED HEALTH STATUS: Chronic | Status: ACTIVE | Noted: 2020-06-22

## 2020-07-09 ENCOUNTER — APPOINTMENT (OUTPATIENT)
Dept: OBGYN | Facility: CLINIC | Age: 50
End: 2020-07-09

## 2020-07-20 ENCOUNTER — APPOINTMENT (OUTPATIENT)
Dept: OBGYN | Facility: CLINIC | Age: 50
End: 2020-07-20
Payer: MEDICAID

## 2020-07-20 VITALS
SYSTOLIC BLOOD PRESSURE: 118 MMHG | TEMPERATURE: 98.1 F | BODY MASS INDEX: 25.49 KG/M2 | HEIGHT: 61 IN | WEIGHT: 135 LBS | DIASTOLIC BLOOD PRESSURE: 75 MMHG

## 2020-07-20 DIAGNOSIS — N92.1 EXCESSIVE AND FREQUENT MENSTRUATION WITH IRREGULAR CYCLE: ICD-10-CM

## 2020-07-20 DIAGNOSIS — Z86.2 PERSONAL HISTORY OF DISEASES OF THE BLOOD AND BLOOD-FORMING ORGANS AND CERTAIN DISORDERS INVOLVING THE IMMUNE MECHANISM: ICD-10-CM

## 2020-07-20 DIAGNOSIS — D25.9 LEIOMYOMA OF UTERUS, UNSPECIFIED: ICD-10-CM

## 2020-07-20 PROCEDURE — 58100 BIOPSY OF UTERUS LINING: CPT | Mod: 59

## 2020-07-20 PROCEDURE — 99213 OFFICE O/P EST LOW 20 MIN: CPT | Mod: 25

## 2020-07-20 RX ORDER — FERROUS SULFATE TAB EC 325 MG (65 MG FE EQUIVALENT) 325 (65 FE) MG
325 (65 FE) TABLET DELAYED RESPONSE ORAL
Refills: 0 | Status: ACTIVE | COMMUNITY

## 2020-07-20 RX ORDER — TRANEXAMIC ACID 650 MG/1
650 TABLET ORAL EVERY 8 HOURS
Qty: 30 | Refills: 0 | Status: ACTIVE | COMMUNITY
Start: 2020-07-20 | End: 1900-01-01

## 2020-07-20 RX ORDER — IBUPROFEN 600 MG/1
600 TABLET, FILM COATED ORAL
Qty: 1 | Refills: 0 | Status: ACTIVE | COMMUNITY
Start: 2020-07-20 | End: 1900-01-01

## 2020-07-20 NOTE — PROCEDURE
[Endometrial Biopsy] : Endometrial biopsy [Irregular Bleeding] : irregular uterine bleeding [Abnormal US] : abnormal ultrasound findings [Risks] : risks [Benefits] : benefits [Patient] : patient [Infection] : infection [Bleeding] : bleeding [Allergic Reaction] : allergic reaction [Uterine Perforation] : uterine perforation [Pain] : pain [CONSENT OBTAINED] : written consent was obtained prior to the procedure. [LMP ___] : LMP was [unfilled] [Neg Pregnancy Test] : a pregnancy test was negative [No Premedication] : No premedication [Betadine] : Betadine [None] : none [Tenaculum] : a single toothed tenaculum [Easy Passage] : allowed easy passage of a uterine sound without dilation [Sounded to ___ cm] : sounded to [unfilled] ~Ucm [Mid Position] : mid position [Pipelle] : a Pipelle endometrial suction curette [Moderate] : a moderate [Tolerated Well] : the patient tolerated the procedure well [Sent to Histology] : the specimen was place in buffered formalin and sent for pathlogy [No Complications] : there were no complications

## 2020-07-23 LAB — CORE LAB BIOPSY: NORMAL

## 2020-07-23 RX ORDER — DICLOFENAC SODIUM 10 MG/G
1 GEL TOPICAL
Qty: 100 | Refills: 0 | Status: ACTIVE | COMMUNITY
Start: 2020-07-18

## 2020-07-23 RX ORDER — FERROUS SULFATE TAB EC 324 MG (65 MG FE EQUIVALENT) 324 (65 FE) MG
324 (65 FE) TABLET DELAYED RESPONSE ORAL
Qty: 60 | Refills: 0 | Status: ACTIVE | COMMUNITY
Start: 2020-07-19

## 2020-07-23 RX ORDER — DOCUSATE SODIUM 100 MG/1
100 CAPSULE, LIQUID FILLED ORAL
Qty: 30 | Refills: 0 | Status: ACTIVE | COMMUNITY
Start: 2020-07-19

## 2020-07-28 DIAGNOSIS — Z01.818 ENCOUNTER FOR OTHER PREPROCEDURAL EXAMINATION: ICD-10-CM

## 2020-07-29 ENCOUNTER — APPOINTMENT (OUTPATIENT)
Dept: DISASTER EMERGENCY | Facility: CLINIC | Age: 50
End: 2020-07-29